# Patient Record
Sex: MALE | Race: WHITE | NOT HISPANIC OR LATINO | Employment: FULL TIME | URBAN - METROPOLITAN AREA
[De-identification: names, ages, dates, MRNs, and addresses within clinical notes are randomized per-mention and may not be internally consistent; named-entity substitution may affect disease eponyms.]

---

## 2019-10-26 ENCOUNTER — OFFICE VISIT (OUTPATIENT)
Dept: URGENT CARE | Facility: CLINIC | Age: 24
End: 2019-10-26
Payer: COMMERCIAL

## 2019-10-26 VITALS
OXYGEN SATURATION: 99 % | RESPIRATION RATE: 16 BRPM | WEIGHT: 194 LBS | BODY MASS INDEX: 23.62 KG/M2 | DIASTOLIC BLOOD PRESSURE: 72 MMHG | SYSTOLIC BLOOD PRESSURE: 128 MMHG | HEART RATE: 90 BPM | TEMPERATURE: 101.2 F | HEIGHT: 76 IN

## 2019-10-26 DIAGNOSIS — J02.0 STREP PHARYNGITIS: Primary | ICD-10-CM

## 2019-10-26 LAB — S PYO AG THROAT QL: POSITIVE

## 2019-10-26 PROCEDURE — 99213 OFFICE O/P EST LOW 20 MIN: CPT | Performed by: PHYSICIAN ASSISTANT

## 2019-10-26 PROCEDURE — 87880 STREP A ASSAY W/OPTIC: CPT | Performed by: PHYSICIAN ASSISTANT

## 2019-10-26 RX ORDER — AZITHROMYCIN 250 MG/1
TABLET, FILM COATED ORAL
Qty: 6 TABLET | Refills: 0 | Status: SHIPPED | OUTPATIENT
Start: 2019-10-26 | End: 2019-10-30

## 2019-10-26 NOTE — PROGRESS NOTES
[facility]        NAME: Mary Ann Taveras is a 27 y o  male  : 1995    MRN: [id number]  DATE: 2019  TIME: 1:47 PM    Assessment and Plan   Strep pharyngitis [J02 0]  1  Strep pharyngitis  POCT rapid strepA    azithromycin (ZITHROMAX) 250 mg tablet         Patient Instructions     Rapid strep positive  Take antibiotic as directed  Recommend probiotics for side effects  Follow up with PCP in 3-5 days  Proceed to  ER if symptoms worsen  Chief Complaint     Chief Complaint   Patient presents with    Sore Throat     Started last night with sore throat, headache, body aches, nausea, chills  History of Present Illness       Patient presents with complaint of sore throat and fever since this AM  Pt reports Tmax of 101  He reports associated symptoms of headache, nausea, chills, and body aches  Pt denies trying any palliative measures  Pt denies recent antibiotic use  He reports being allergic to amoxicillin and states that his reaction is hives  Review of Systems   Review of Systems   Constitutional: Positive for chills and fever  Negative for fatigue  HENT: Positive for sore throat  Negative for congestion, ear pain, postnasal drip and rhinorrhea  Respiratory: Negative for cough, chest tightness and shortness of breath  Cardiovascular: Negative for chest pain  Gastrointestinal: Positive for nausea  Negative for abdominal pain, blood in stool, diarrhea and vomiting  Musculoskeletal: Positive for myalgias  Negative for neck pain and neck stiffness  Skin: Negative for color change, rash and wound  Neurological: Negative for dizziness, weakness, light-headedness, numbness and headaches  All other systems reviewed and are negative          Current Medications       Current Outpatient Medications:     azithromycin (ZITHROMAX) 250 mg tablet, Take 2 tabs by mouth today, then take 1 tab by mouth x 4 days, Disp: 6 tablet, Rfl: 0    Current Allergies     Allergies as of 10/26/2019 - Reviewed 10/26/2019   Allergen Reaction Noted    Amoxicillin Hives 10/26/2019            The following portions of the patient's history were reviewed and updated as appropriate: allergies, current medications, past family history, past medical history, past social history, past surgical history and problem list      No past medical history on file  No past surgical history on file  No family history on file  Medications have been verified  Objective   /72   Pulse 90   Temp (!) 101 2 °F (38 4 °C)   Resp 16   Ht 6' 4" (1 93 m)   Wt 88 kg (194 lb)   SpO2 99%   BMI 23 61 kg/m²        Physical Exam     Physical Exam   Constitutional: He is oriented to person, place, and time  He appears well-developed and well-nourished  Non-toxic appearance  He does not appear ill  No distress  HENT:   Head: Normocephalic and atraumatic  Right Ear: Tympanic membrane and ear canal normal  No drainage  Left Ear: Tympanic membrane and ear canal normal  No drainage  Mouth/Throat: Uvula is midline and mucous membranes are normal  Posterior oropharyngeal erythema present  No oropharyngeal exudate or posterior oropharyngeal edema  Tonsils are 2+ on the right  Tonsils are 2+ on the left  Tonsillar exudate  Eyes: Pupils are equal, round, and reactive to light  Conjunctivae and EOM are normal    Neck: Normal range of motion  Neck supple  Cardiovascular: Normal rate, regular rhythm and normal heart sounds  Pulmonary/Chest: Effort normal and breath sounds normal  No respiratory distress  He has no wheezes  He has no rhonchi  He has no rales  Lymphadenopathy:     He has cervical adenopathy  Neurological: He is alert and oriented to person, place, and time  No cranial nerve deficit or sensory deficit  Skin: Skin is warm and dry  Capillary refill takes less than 2 seconds  No rash noted  He is not diaphoretic  Psychiatric: He has a normal mood and affect   His behavior is normal  Thought content normal    Nursing note and vitals reviewed